# Patient Record
Sex: FEMALE | Race: BLACK OR AFRICAN AMERICAN | NOT HISPANIC OR LATINO | Employment: FULL TIME | URBAN - METROPOLITAN AREA
[De-identification: names, ages, dates, MRNs, and addresses within clinical notes are randomized per-mention and may not be internally consistent; named-entity substitution may affect disease eponyms.]

---

## 2022-03-28 ENCOUNTER — HOSPITAL ENCOUNTER (OUTPATIENT)
Dept: RADIOLOGY | Facility: HOSPITAL | Age: 47
Discharge: HOME | End: 2022-03-28
Attending: STUDENT IN AN ORGANIZED HEALTH CARE EDUCATION/TRAINING PROGRAM
Payer: OTHER GOVERNMENT

## 2022-03-28 VITALS — BODY MASS INDEX: 37.17 KG/M2 | WEIGHT: 202 LBS | HEIGHT: 62 IN

## 2022-03-28 DIAGNOSIS — Z12.31 ENCOUNTER FOR SCREENING MAMMOGRAM FOR MALIGNANT NEOPLASM OF BREAST: ICD-10-CM

## 2022-03-28 PROCEDURE — 77063 MAMMO DIGITAL SCREENING BILAT WITH TOMO: ICD-10-PCS | Mod: 26,,, | Performed by: RADIOLOGY

## 2022-03-28 PROCEDURE — 77067 MAMMO DIGITAL SCREENING BILAT WITH TOMO: ICD-10-PCS | Mod: 26,,, | Performed by: RADIOLOGY

## 2022-03-28 PROCEDURE — 77067 SCR MAMMO BI INCL CAD: CPT | Mod: 26,,, | Performed by: RADIOLOGY

## 2022-03-28 PROCEDURE — 77063 BREAST TOMOSYNTHESIS BI: CPT | Mod: TC

## 2022-03-28 PROCEDURE — 77067 SCR MAMMO BI INCL CAD: CPT | Mod: TC

## 2022-03-28 PROCEDURE — 77063 BREAST TOMOSYNTHESIS BI: CPT | Mod: 26,,, | Performed by: RADIOLOGY

## 2022-03-29 ENCOUNTER — TELEPHONE (OUTPATIENT)
Dept: SURGERY | Facility: CLINIC | Age: 47
End: 2022-03-29
Payer: OTHER GOVERNMENT

## 2022-05-03 NOTE — PROGRESS NOTES
Reason For Consultation:   Increased lifetime risk of breast cancer    Referring Provider:   Nargis Hicks MD  [address] [place] [address]    Records Obtained: Records of the patients history including those obtained from the referring provider were reviewed and summarized in detail.    HPI:   Kermit Lucas presents for consultation of increased risk of breast cancer. She is premenopausal. She presented for screening mammogram on 3/28/22 which was benign but revealed a Tyrer-Cuzick score of 28.84%.       Today, Feels good and no complaints.   No breast concerns.    High Risk Breast cancer specific history:  - Age: 46 y.o.   - Height:  1.612 meters  - Weight: 94.2 kg  - Breast density per BI-RADS: Heterogeneously Dense    - Age at menarche:  12  - Number of pregnancies: ; age of first live birth: 32  - History of breast feeding: No.   - Age at menopause, if applicable:  N/A.  -Uterus and ovaries intact: Yes  - HRT: No; OCPS x 4 years       - Genetic testing: No  - Personal history of cancer: No  - Previous chest radiation exposure between ages 10-30 years old: No  - Personal history of breast biopsy: No  - Ashkenazi Yarsanism Inheritance: No  - Family history of cancer:     Mother Breast Cancer diagnosed 61;  from BC 69    Maternal Aunt Breast Cancer in her 50's;  about 20 years now     Social History:  Tobacco use:  None  Alcohol use:  Occasionally   Exercise regimen: Tries to   Employment:      SEE CALCULATED RISK BELOW.          Family History  Family History   Problem Relation Age of Onset    Breast cancer Mother 61        Reaccurrance @ 69    Breast cancer Maternal Aunt      Medications  No current outpatient medications on file.  Allergies  Review of patient's allergies indicates:  Not on File    Review of Systems       See above   Review of Systems  Review of Systems  All other systems reviewed and are negative.    Objective:      Vitals:   Vitals:    22 1457  "  BP: (!) 146/94   Pulse: 68   Resp: 18   Temp: 98.1 °F (36.7 °C)   TempSrc: Oral   SpO2: 99%   Weight: 94.2 kg (207 lb 10.8 oz)   Height: 5' 3.47" (1.612 m)     BMI: Body mass index is 36.25 kg/m².   Body surface area is 2.05 meters squared.    Physical Exam      Laboratory Data: reviewed most recent   Imaging: reviewed most recent        Assessment:     1. At high risk for breast cancer    2. Family history of breast cancer in mother        1. Increased risk of breast cancer   * Tyrer-Cuzick (TC) lifetime risk of 30.9%. We discussed that TC score will categorize your lifetime risk of being diagnosed with breast cancer. Categories are as such: Average risk <15%, Intermediate risk 15-19%, and High risk > or = to 20%.    * The Nat Model for Breast Cancer risk: She has a 1.5% 5-year breast cancer risk (Compared with 1% for the average 46 y.o. woman) and 14.2% Lifetime breast cancer risk (Compared with 9.4% for the average 46 y.o. woman). A woman's risk is considered low if her five-year risk of developing breast cancer is less than 1.6%; it is considered high if she scores above 1.66%. (All women who are over 60 have a score of at least 1.66 and are considered high risk, based on the Nat Model.)          Risk factors are categorized into 2 groups: Modifiable and Non-modifiable. Modifiable risk factors include use of hormones, alcohol, smoking, diet and exercise. Non-modifiable risk factors include breast density, genetics, chest radiation, previous pregnancies, age of first period, and age of menopause.    * For women at high risk for breast cancer, endocrine therapy can reduce the risk of invasive and/or in situ breast cancers. (tamoxifen for premenopausal or postmenopausal women and raloxifene or exemestane for postmenopausal women).   * Discussed that Tamoxifen 20 mg daily for 5 years has shown to reduce risk of breast cancer by 49% and women with ADH/ALH or LCIS have an even more significant reduction of risk of " 86%. Aromatase inhibitors for 5 years have also shown risk reduction in terms of 50-60%. At current, there is not adequate data to recommend longer courses of therapy more than 5 years for risk reduction.    * Tamoxifen has limited data in BRCA 1/2 mutation carriers but limited retrospective data is suggestive of benefit. There is retrospective data that aromatase inhibitors can reduce the risk of contralateral ER positive breast cancers in BRCA 1/2 patients who were taking AIs as adjuvant therapy. There is no data for raloxifen in the population.    * Reviewed risks of Tamoxifen side effects include hot flashes, invasive endometrial cancer in women > 49 years of age (2.3/1000 compared to 0.9/1000), cataracts, increased risk of pulmonary embolism among others. A handout was given to her.   * Reviewed Lifestyle modifications which have shown benefit:  · Limit alcohol consumption to less than 1 drink per day (1 ounce liquor, 6 oz wine, 8 oz beer)  · Avoid smoking.  · Exercise at least 150 minutes per week of moderate intensity aerobic activity or at least 75 minutes of vigorous activity. Exercise can lower the relative risk of breast cancer by ~18-20%.  · Maintain healthy weight and avoid post-menopausal weight gain. Avoid processed foods and eat more lean proteins, fruits and vegetables.                * Discussed available resources including genetic counseling, nutrition, weight management.    * Reviewed future screening:   · Semiannual (every 6 months) CBE (clinical breast exam).   · Annual Breast MRI alternating with an annual MMG. if TC 20% or greater.                   Discussed with patient that there are several models available for stratifying breast cancer risk, and Desmond-Sonnyck is presently the model utilized by [facility] and is a model recommended per current NCCN guidelines.    The Nat Model for Breast Cancer risk estimates the absolute 5 year risk and lifetime risk of developing breast  cancer. Family history includes only first degree relatives with breast cancer, which is not enough information to estimate the risk of a patient having BRCA mutation. It also underestimates the cancer risk for patients with extensive family history. The Nat Model is a good predictor of risk for populations but not for individuals. It adjusts risk for race/ethnicity. It may underestimate breast cancer risk in patients with atypical hyperplasia and strong family history. The Nat Model was NOT designed to estimate risk for: Women with a prior diagnosis of breast cancer, lobular carcinoma in situ (LCIS), or ductal carcinoma in situ (DCIS);  Women who have received previous radiation therapy to the chest for treatment of Hodgkin lymphoma;  Women with gene mutations in BRCA1 or BRCA2, or those who are known to have certain genetic syndromes that increase risk for breast cancer; Women of age <35 or >85.    We discussed that there are limitations to every model for risk assessment, particularly that TC can overestimate risk in women with atypical hyperplasia and dense breasts and that Nat underestimates risk for those with a strong family history of breast or ovarian cancers as well as non-white women with atypical hyperplasia which can make them appear to not be candidates for risk reducing therapies.     Plan:     1. Patient has opted out chemoprevention but will call in the future if she wishes to pursue.   2. Patient elects to proceed with alternating annual mammogram and annual breast MRI along with semiannual CBEs.  3. Patient will follow up with PCP or GYN for one semiannual CBE along with annual mammogram in March 2023 and will RTC here for one semiannual CBE along with annual breast MRI in September 2022.  4. Lifestyle modifications as detailed above.   5.   Encouraged breast awareness, including monthly breast self-exams.       RTC in 1 year to see me either at [facility] or on 3rd floor.     Questions were  encouraged and answered to patient's satisfaction, and patient verbalized understanding of information and agreement with the plan. Advised patient to RTC with any interval changes or concerns.        Patient is in agreement with the proposed treatment plan. All questions were answered to the patient's satisfaction. Patient knows to call clinic for any new or worsening symptoms and if anything is needed before the next clinic visit.          Mel Mcfarland, LLOYDP-C  Hematology & Medical Oncology   [address] [place] [address]  ph. [phone]  Fax. [phone]    Collaborating physician, Dr. Naranjo.    Total time spent with the patient: 30 minutes, 20 minutes of face to face consultation and 10 minutes of chart review and coordination of care, on the day of the visit. This includes face to face time and non-face to face time preparing to see the patient (eg, review of tests), obtaining and/or reviewing separately obtained history, documenting clinical information in the electronic or other health record, independently interpreting resultsand communicating results to the patient/family/caregiver, or care coordination.    Route Chart for Scheduling    Med Onc Chart Routing      Follow up with physician    Follow up with [facility] 1 year.   Labs    Imaging Mammogram and MRI   MRI in September, Mammo in June    Pharmacy appointment    Other referrals

## 2022-05-17 ENCOUNTER — OFFICE VISIT (OUTPATIENT)
Dept: HEMATOLOGY/ONCOLOGY | Facility: CLINIC | Age: 47
End: 2022-05-17
Payer: OTHER GOVERNMENT

## 2022-05-17 VITALS
HEART RATE: 68 BPM | OXYGEN SATURATION: 99 % | WEIGHT: 207.69 LBS | TEMPERATURE: 98 F | RESPIRATION RATE: 18 BRPM | HEIGHT: 63 IN | BODY MASS INDEX: 36.8 KG/M2 | DIASTOLIC BLOOD PRESSURE: 94 MMHG | SYSTOLIC BLOOD PRESSURE: 146 MMHG

## 2022-05-17 DIAGNOSIS — Z80.3 FAMILY HISTORY OF BREAST CANCER IN MOTHER: ICD-10-CM

## 2022-05-17 DIAGNOSIS — Z91.89 AT HIGH RISK FOR BREAST CANCER: Primary | ICD-10-CM

## 2022-05-17 PROCEDURE — 99204 PR OFFICE/OUTPT VISIT, NEW, LEVL IV, 45-59 MIN: ICD-10-PCS | Mod: S$PBB,,, | Performed by: NURSE PRACTITIONER

## 2022-05-17 PROCEDURE — 99999 PR PBB SHADOW E&M-EST. PATIENT-LVL III: ICD-10-PCS | Mod: PBBFAC,,, | Performed by: NURSE PRACTITIONER

## 2022-05-17 PROCEDURE — 99204 OFFICE O/P NEW MOD 45 MIN: CPT | Mod: S$PBB,,, | Performed by: NURSE PRACTITIONER

## 2022-05-17 PROCEDURE — 99999 PR PBB SHADOW E&M-EST. PATIENT-LVL III: CPT | Mod: PBBFAC,,, | Performed by: NURSE PRACTITIONER

## 2022-05-17 PROCEDURE — 99213 OFFICE O/P EST LOW 20 MIN: CPT | Mod: PBBFAC | Performed by: NURSE PRACTITIONER

## 2022-06-30 ENCOUNTER — HOSPITAL ENCOUNTER (OUTPATIENT)
Dept: RADIOLOGY | Facility: HOSPITAL | Age: 47
Discharge: HOME | End: 2022-06-30
Attending: NURSE PRACTITIONER
Payer: OTHER GOVERNMENT

## 2022-06-30 DIAGNOSIS — Z91.89 AT HIGH RISK FOR BREAST CANCER: ICD-10-CM

## 2022-06-30 DIAGNOSIS — Z80.3 FAMILY HISTORY OF BREAST CANCER IN MOTHER: ICD-10-CM

## 2022-08-23 PROBLEM — Z91.89 AT HIGH RISK FOR BREAST CANCER: Status: ACTIVE | Noted: 2022-08-23

## 2022-08-23 NOTE — PROGRESS NOTES
"  Reason For Consultation:   Increased lifetime risk of breast cancer    Referring Provider:   Aaarefnickal Self  No address on file    Records Obtained: Records of the patients history including those obtained from the referring provider were reviewed and summarized in detail.    HPI:   Kermit Lucas presents for consultation of increased risk of breast cancer. She is {Menopause:24754}. She presented for screening mammogram on *** which was benign but revealed a Tyrer-Cuzick score of ***%.       Today, Feels good and no complaints.   No breast concerns.    High Risk Breast cancer specific history:  - Age: 46 y.o.   - Height:  ***  - Weight: ***  - Breast density per BI-RADS:    - Age at menarche:  ***  - Number of pregnancies: G***P***; age of first live birth: ***   ***G:  (number of pregnancies) · P: para (number of completed pregnancies beyond 20 weeks gestation (whether viable or nonviable) · A or Ab: abortus (abortions) · nulligravida  0: no pregnancies.***  - History of breast feeding: {YES /NO:34957}.   - Age at menopause, if applicable:  ***. ***Reports regular/irregular*** menstrual cycle and reports/denies*** menopausal symptoms (hot flashes, sleep disturbances, depression, vaginal dryness).  -Uterus and ovaries intact: {yes no:439301::"Yes"}  - HRT: {YES /NO:40493}      - Genetic testing: {YES /NO:04348}  - Personal history of cancer: {YES /NO:40458}  - Previous chest radiation exposure between ages 10-30 years old: {YES /NO:71483}  - Personal history of breast biopsy: {YES /NO:95314}  - Ashkenazi Spiritism Inheritance: {YES /NO:52317}  - Family history of cancer:  ***    Social History:  Tobacco use:  ***  Alcohol use:  ***  Exercise regimen: ***  Employment: ***    SEE CALCULATED RISK BELOW.     Past Medical   No past medical history on file.  Patient Active Problem List   Diagnosis    At high risk for breast cancer     Social History      Family History  Family History   Problem Relation " Age of Onset    Breast cancer Mother 61        Reaccurrance @ 69    Breast cancer Maternal Aunt      Medications  No current outpatient medications on file.  Allergies  Review of patient's allergies indicates:  No Known Allergies    Review of Systems       See above   Review of Systems  Review of Systems  All other systems reviewed and are negative.    Objective:      Vitals: There were no vitals filed for this visit.  BMI: There is no height or weight on file to calculate BMI.   There is no height or weight on file to calculate BSA.    Physical Exam      Laboratory Data: reviewed most recent   Imaging: reviewed most recent        Assessment:     1. At high risk for breast cancer        1. Increased risk of breast cancer   * Bria (TC) lifetime risk of ***%. We discussed that TC score will categorize your lifetime risk of being diagnosed with breast cancer. Categories are as such: Average risk <15%, Intermediate risk 15-19%, and High risk > or = to 20%.    *The Nat Model for Breast Cancer risk: She has a ***% 5-year breast cancer risk (Compared with ***% for the average 46 y.o. woman) and ***% Lifetime breast cancer risk (Compared with ***% for the average 46 y.o. woman). A woman's risk is considered low if her five-year risk of developing breast cancer is less than 1.6%; it is considered high if she scores above 1.66%. (All women who are over 60 have a score of at least 1.66 and are considered high risk, based on the Nat Model.)     Recommendation for women with elevated TC score:      Recommendation for women with elevated Nat Model.     ***TC to determine MRI and Nat to determine chemoprevention***    Risk factors are categorized into 2 groups: Modifiable and Non-modifiable. Modifiable risk factors include use of hormones, alcohol, smoking, diet and exercise. Non-modifiable risk factors include breast density, genetics, chest radiation, previous pregnancies, age of first period, and age of  menopause.    * For women at high risk for breast cancer, endocrine therapy can reduce the risk of invasive and/or in situ breast cancers. (tamoxifen for premenopausal or postmenopausal women and raloxifene or exemestane for postmenopausal women).   * Discussed that Tamoxifen 20 mg daily for 5 years has shown to reduce risk of breast cancer by 49% and women with ADH/ALH or LCIS have an even more significant reduction of risk of 86%. Aromatase inhibitors for 5 years have also shown risk reduction in terms of 50-60%. At current, there is not adequate data to recommend longer courses of therapy more than 5 years for risk reduction.    * Tamoxifen has limited data in BRCA 1/2 mutation carriers but limited retrospective data is suggestive of benefit. There is retrospective data that aromatase inhibitors can reduce the risk of contralateral ER positive breast cancers in BRCA 1/2 patients who were taking AIs as adjuvant therapy. There is no data for raloxifen in the population.    * Reviewed risks of Tamoxifen side effects include hot flashes, invasive endometrial cancer in women > 49 years of age (2.3/1000 compared to 0.9/1000), cataracts, increased risk of pulmonary embolism among others. A handout was given to her.   * Reviewed Lifestyle modifications which have shown benefit:  · Limit alcohol consumption to less than 1 drink per day (1 ounce liquor, 6 oz wine, 8 oz beer)  · Avoid smoking.  · Exercise at least 150 minutes per week of moderate intensity aerobic activity or at least 75 minutes of vigorous activity. Exercise can lower the relative risk of breast cancer by ~18-20%.  · Maintain healthy weight and avoid post-menopausal weight gain. Avoid processed foods and eat more lean proteins, fruits and vegetables.                * Discussed available resources including genetic counseling, nutrition, weight management.    * Reviewed future screening:   · Semiannual (every 6 months) CBE (clinical breast exam).   · Annual  Breast MRI alternating with an annual MMG. if TC 20% or greater.                   Discussed with patient that there are several models available for stratifying breast cancer risk, and Desmond-Sonnyck is presently the model utilized by [facility] and is a model recommended per current NCCN guidelines.    The Nat Model for Breast Cancer risk estimates the absolute 5 year risk and lifetime risk of developing breast cancer. Family history includes only first degree relatives with breast cancer, which is not enough information to estimate the risk of a patient having BRCA mutation. It also underestimates the cancer risk for patients with extensive family history. The Nat Model is a good predictor of risk for populations but not for individuals. It adjusts risk for race/ethnicity. It may underestimate breast cancer risk in patients with atypical hyperplasia and strong family history. The Nat Model was NOT designed to estimate risk for: Women with a prior diagnosis of breast cancer, lobular carcinoma in situ (LCIS), or ductal carcinoma in situ (DCIS);  Women who have received previous radiation therapy to the chest for treatment of Hodgkin lymphoma;  Women with gene mutations in BRCA1 or BRCA2, or those who are known to have certain genetic syndromes that increase risk for breast cancer; Women of age <35 or >85.    We discussed that there are limitations to every model for risk assessment, particularly that TC can overestimate risk in women with atypical hyperplasia and dense breasts and that Nat underestimates risk for those with a strong family history of breast or ovarian cancers as well as non-white women with atypical hyperplasia which can make them appear to not be candidates for risk reducing therapies.             Plan:     1. Patient has opted *** chemoprevention but will call in the future if she wishes to pursue. ***if starting tamoxifen, needs to change OCP to possibly IUD. Instructed to start  while on cycle.***  2. Patient elects to proceed with {options for increased risk of breast cancer:82251}.  3. Patient will follow up with PCP or GYN for one semiannual CBE along with annual mammogram in *** and will RTC here for one semiannual CBE along with annual breast MRI in ***.  4. Lifestyle modifications as detailed above.   5.   Encouraged breast awareness, including monthly breast self-exams.   6.   ***Refer to Filipe Park NP  for Genetic counseling.   7.   ***Refer to nutritionist or bariatrician.   8.   ***Refer to Dr. Nathan for a well woman to discuss hormonal **** or contraceptive therapy****    RTC in *** to see me either at [facility] or on 3rd floor..     Questions were encouraged and answered to patient's satisfaction, and patient verbalized understanding of information and agreement with the plan. Advised patient to RTC with any interval changes or concerns.        ***Patient declines {options for increased risk of breast cancer:82615} at this time and was advised to contact clinic with any changes in her decision.       *** OTHER THOUGHTS***  Risk reduction therapy in those < 35 years of age is unknown.     Can consider AI if Tamoxifen contraindicated.     Raloxifene can be given to post-menopausal women > 36 yo for risk reduction.  Unlikely as effective as Tamoxifen, but good option if uterus still in place or osteoporosis. Raloxifene has been shown to have a reduction in invasive breast cancer, a reduction in incidence of vertebral fractures, no increased incidence of endometrial cancer.     Should try to avoid tamoxifen in PTEN patients due to PTEN carrying endometrial cancer risk.       Patient is in agreement with the proposed treatment plan. All questions were answered to the patient's satisfaction. Patient knows to call clinic for any new or worsening symptoms and if anything is needed before the next clinic visit.          Mel Mcfarland, FNP-C  Hematology & Medical Oncology   [address]  [place] [address]  ph. [phone]  Fax. [phone]    Patient discussed with collaborating physician,  ***.    Total time spent with the patient: *** minutes, *** minutes of face to face consultation and *** minutes of chart review and coordination of care, on the day of the visit. This includes face to face time and non-face to face time preparing to see the patient (eg, review of tests), obtaining and/or reviewing separately obtained history, documenting clinical information in the electronic or other health record, independently interpreting resultsand communicating results to the patient/family/caregiver, or care coordination.

## 2022-09-06 ENCOUNTER — OFFICE VISIT (OUTPATIENT)
Dept: HEMATOLOGY/ONCOLOGY | Facility: CLINIC | Age: 47
End: 2022-09-06
Payer: OTHER GOVERNMENT

## 2022-09-06 VITALS
TEMPERATURE: 93 F | BODY MASS INDEX: 36.45 KG/M2 | SYSTOLIC BLOOD PRESSURE: 138 MMHG | OXYGEN SATURATION: 96 % | RESPIRATION RATE: 18 BRPM | HEART RATE: 73 BPM | DIASTOLIC BLOOD PRESSURE: 69 MMHG | WEIGHT: 205.69 LBS | HEIGHT: 63 IN

## 2022-09-06 DIAGNOSIS — Z91.89 AT HIGH RISK FOR BREAST CANCER: ICD-10-CM

## 2022-09-06 PROCEDURE — 99999 PR PBB SHADOW E&M-EST. PATIENT-LVL III: ICD-10-PCS | Mod: PBBFAC,,, | Performed by: NURSE PRACTITIONER

## 2022-09-06 PROCEDURE — 99214 PR OFFICE/OUTPT VISIT, EST, LEVL IV, 30-39 MIN: ICD-10-PCS | Mod: S$PBB,,, | Performed by: NURSE PRACTITIONER

## 2022-09-06 PROCEDURE — 99213 OFFICE O/P EST LOW 20 MIN: CPT | Mod: PBBFAC | Performed by: NURSE PRACTITIONER

## 2022-09-06 PROCEDURE — 99214 OFFICE O/P EST MOD 30 MIN: CPT | Mod: S$PBB,,, | Performed by: NURSE PRACTITIONER

## 2022-09-06 PROCEDURE — 99999 PR PBB SHADOW E&M-EST. PATIENT-LVL III: CPT | Mod: PBBFAC,,, | Performed by: NURSE PRACTITIONER

## 2022-09-06 RX ORDER — DICLOFENAC SODIUM 10 MG/G
GEL TOPICAL
COMMUNITY
Start: 2022-07-05

## 2022-09-06 RX ORDER — AMLODIPINE BESYLATE 10 MG/1
1 TABLET ORAL DAILY
COMMUNITY
Start: 2021-09-07 | End: 2022-09-09

## 2022-09-06 RX ORDER — TRAZODONE HYDROCHLORIDE 100 MG/1
1 TABLET ORAL NIGHTLY PRN
COMMUNITY
Start: 2022-03-04

## 2022-09-06 RX ORDER — LEVONORGESTREL AND ETHINYL ESTRADIOL 0.15-0.03
1 KIT ORAL DAILY
COMMUNITY
Start: 2022-02-16

## 2022-09-06 NOTE — PROGRESS NOTES
Reason For Consultation:   Increased lifetime risk of breast cancer    Records Obtained: Records of the patients history including those obtained from the referring provider were reviewed and summarized in detail.    HPI:   Kermit Lucas presents for consultation of increased risk of breast cancer. She is premenopausal. She presented for screening mammogram on 3/28/22 which was benign but revealed a Tyrer-Cuzick score of 28.84%.       Today, Feels good and no complaints.   No breast concerns. Denies nipple discharge. Denies skin changes to the breasts.   Denies smoking, occasional alcohol. Minimal exercise.     High Risk Breast cancer specific history:  - Age: 47 y.o.   - Height:  1.612 meters  - Weight: 94.2 kg  - Breast density per BI-RADS: Heterogeneously Dense    - Age at menarche:  12  - Number of pregnancies: ; age of first live birth: 32  - History of breast feeding: No.   - Age at menopause, if applicable:  N/A.  -Uterus and ovaries intact: Yes  - HRT: No; OCPS x 4 years       - Genetic testing: No  - Personal history of cancer: No  - Previous chest radiation exposure between ages 10-30 years old: No  - Personal history of breast biopsy: No  - Ashkenazi Yarsanism Inheritance: No  - Family history of cancer:     Mother Breast Cancer diagnosed 61;  from BC 69    Maternal Aunt Breast Cancer in her 50's;  about 20 years now     Social History:  Tobacco use:  None  Alcohol use:  Occasionally   Exercise regimen: Tries to   Employment:      SEE CALCULATED RISK BELOW.          Family History  Family History   Problem Relation Age of Onset    Breast cancer Mother 61        Reaccurrance @ 69    Breast cancer Maternal Aunt      Medications    Current Outpatient Medications:     amLODIPine (NORVASC) 10 MG tablet, Take 1 tablet by mouth once daily., Disp: , Rfl:     diclofenac sodium (VOLTAREN) 1 % Gel, Apply topically., Disp: , Rfl:     levonorgestrel-ethinyl estradiol (SEASONALE) 0.15  "mg-30 mcg (91) per tablet, Take 1 tablet by mouth once daily., Disp: , Rfl:     traZODone (DESYREL) 100 MG tablet, Take 1 tablet by mouth nightly as needed., Disp: , Rfl:   Allergies  Review of patient's allergies indicates:  No Known Allergies    Review of Systems       See above   Review of Systems  Review of Systems  All other systems reviewed and are negative.    Objective:      Vitals:   Vitals:    09/06/22 1456   BP: 138/69   Pulse: 73   Resp: 18   Temp: (!) 93.3 °F (34.1 °C)   TempSrc: Oral   SpO2: 96%   Weight: 93.3 kg (205 lb 11 oz)   Height: 5' 3.47" (1.612 m)     BMI: Body mass index is 35.9 kg/m².   Body surface area is 2.04 meters squared.    Physical Exam  Vitals and nursing note reviewed.   Constitutional:       General: She is not in acute distress.     Appearance: Normal appearance. She is well-developed.   HENT:      Head: Normocephalic.   Cardiovascular:      Rate and Rhythm: Normal rate and regular rhythm.      Heart sounds: Normal heart sounds.   Pulmonary:      Effort: Pulmonary effort is normal.      Breath sounds: Normal breath sounds.   Chest:   Breasts:     Right: Normal.      Left: Normal.   Abdominal:      General: Bowel sounds are normal. There is no distension.      Palpations: Abdomen is soft.      Tenderness: There is no abdominal tenderness.   Musculoskeletal:      Cervical back: Normal range of motion.   Lymphadenopathy:      Cervical: No cervical adenopathy.      Upper Body:      Right upper body: No supraclavicular or axillary adenopathy.      Left upper body: No supraclavicular or axillary adenopathy.   Skin:     General: Skin is warm and dry.      Findings: No rash.   Neurological:      Mental Status: She is alert and oriented to person, place, and time.   Psychiatric:         Behavior: Behavior normal.         Laboratory Data: reviewed most recent   Imaging: reviewed most recent        Assessment:     1. At high risk for breast cancer        1. Increased risk of breast " cancer   * Tyrer-Cuzick (TC) lifetime risk of 32.1% recalculated in clinic today. We discussed that TC score will categorize your lifetime risk of being diagnosed with breast cancer. Categories are as such: Average risk <15%, Intermediate risk 15-19%, and High risk > or = to 20%.    * The Nat Model for Breast Cancer risk: She has a 1.5% 5-year breast cancer risk (Compared with 1% for the average 47 y.o. woman) and 14.2% Lifetime breast cancer risk (Compared with 9.4% for the average 47 y.o. woman). A woman's risk is considered low if her five-year risk of developing breast cancer is less than 1.6%; it is considered high if she scores above 1.66%. (All women who are over 60 have a score of at least 1.66 and are considered high risk, based on the Nat Model.)      Risk factors are categorized into 2 groups: Modifiable and Non-modifiable. Modifiable risk factors include use of hormones, alcohol, smoking, diet and exercise. Non-modifiable risk factors include breast density, genetics, chest radiation, previous pregnancies, age of first period, and age of menopause.    * For women at high risk for breast cancer, endocrine therapy can reduce the risk of invasive and/or in situ breast cancers. (tamoxifen for premenopausal or postmenopausal women and raloxifene or exemestane for postmenopausal women).   * Discussed that Tamoxifen 20 mg daily for 5 years has shown to reduce risk of breast cancer by 49% and women with ADH/ALH or LCIS have an even more significant reduction of risk of 86%. Aromatase inhibitors for 5 years have also shown risk reduction in terms of 50-60%. At current, there is not adequate data to recommend longer courses of therapy more than 5 years for risk reduction.       * Reviewed risks of Tamoxifen side effects include hot flashes, invasive endometrial cancer in women > 49 years of age (2.3/1000 compared to 0.9/1000), cataracts, increased risk of pulmonary embolism among others. A handout was given to  her.   * Reviewed Lifestyle modifications which have shown benefit:  Limit alcohol consumption to less than 1 drink per day (1 ounce liquor, 6 oz wine, 8 oz beer)  Avoid smoking.  Exercise at least 150 minutes per week of moderate intensity aerobic activity or at least 75 minutes of vigorous activity. Exercise can lower the relative risk of breast cancer by ~18-20%.  Maintain healthy weight and avoid post-menopausal weight gain. Avoid processed foods and eat more lean proteins, fruits and vegetables.                * Discussed available resources including genetic counseling, nutrition, weight management.    * Reviewed future screening:   Semiannual (every 6 months) CBE (clinical breast exam).   Annual Breast MRI alternating with an annual MMG. if TC 20% or greater.                   Discussed with patient that there are several models available for stratifying breast cancer risk, and Desmond-Harmeet is presently the model utilized by [facility] and is a model recommended per current NCCN guidelines.    The Nat Model for Breast Cancer risk estimates the absolute 5 year risk and lifetime risk of developing breast cancer. Family history includes only first degree relatives with breast cancer, which is not enough information to estimate the risk of a patient having BRCA mutation. It also underestimates the cancer risk for patients with extensive family history. The Nat Model is a good predictor of risk for populations but not for individuals. It adjusts risk for race/ethnicity. It may underestimate breast cancer risk in patients with atypical hyperplasia and strong family history. The Nat Model was NOT designed to estimate risk for: Women with a prior diagnosis of breast cancer, lobular carcinoma in situ (LCIS), or ductal carcinoma in situ (DCIS);  Women who have received previous radiation therapy to the chest for treatment of Hodgkin lymphoma;  Women with gene mutations in BRCA1 or BRCA2, or those who are  known to have certain genetic syndromes that increase risk for breast cancer; Women of age <35 or >85.    We discussed that there are limitations to every model for risk assessment, particularly that TC can overestimate risk in women with atypical hyperplasia and dense breasts and that Nat underestimates risk for those with a strong family history of breast or ovarian cancers as well as non-white women with atypical hyperplasia which can make them appear to not be candidates for risk reducing therapies.     Plan:     Patient has opted out chemoprevention but will call in the future if she wishes to pursue.   Patient elects to proceed with alternating annual mammogram and annual breast MRI along with semiannual CBEs.  Patient will follow up with PCP or GYN for one semiannual CBE along with annual mammogram in March 2023 and will RTC here for one semiannual CBE along with annual breast MRI in September 2022.  Lifestyle modifications as detailed above.   5.   Encouraged breast awareness, including monthly breast self-exams.       RTC in 1 year to see me either at [facility] or on 3rd floor.     Questions were encouraged and answered to patient's satisfaction, and patient verbalized understanding of information and agreement with the plan. Advised patient to RTC with any interval changes or concerns.        Patient is in agreement with the proposed treatment plan. All questions were answered to the patient's satisfaction. Patient knows to call clinic for any new or worsening symptoms and if anything is needed before the next clinic visit.          Mel Mcfarland, LLOYDP-C  Hematology & Medical Oncology   [facility] [place] [address]  ph. [phone]  Fax. [phone]    Collaborating physician, Dr. Naranjo.    Total time spent with the patient: 30 minutes, 20 minutes of face to face consultation and 10 minutes of chart review and coordination of care, on the day of the visit. This includes face to face time  and non-face to face time preparing to see the patient (eg, review of tests), obtaining and/or reviewing separately obtained history, documenting clinical information in the electronic or other health record, independently interpreting resultsand communicating results to the patient/family/caregiver, or care coordination.    Route Chart for Scheduling    Med Onc Chart Routing      Follow up with physician    Follow up with [facility] 6 months.   Infusion scheduling note    Injection scheduling note    Labs    Imaging Mammogram and MRI   Mammo in June   Pharmacy appointment    Other referrals

## 2022-09-20 ENCOUNTER — HOSPITAL ENCOUNTER (OUTPATIENT)
Dept: RADIOLOGY | Facility: HOSPITAL | Age: 47
Discharge: HOME | End: 2022-09-20
Attending: NURSE PRACTITIONER
Payer: OTHER GOVERNMENT

## 2022-09-20 DIAGNOSIS — Z91.89 AT HIGH RISK FOR BREAST CANCER: ICD-10-CM

## 2022-09-20 DIAGNOSIS — Z80.3 FAMILY HISTORY OF BREAST CANCER IN MOTHER: ICD-10-CM

## 2022-09-20 PROCEDURE — 77049 MRI BREAST W/WO CONTRAST, W/CAD, BILATERAL: ICD-10-PCS | Mod: 26,,, | Performed by: RADIOLOGY

## 2022-09-20 PROCEDURE — A9577 INJ MULTIHANCE: HCPCS | Performed by: NURSE PRACTITIONER

## 2022-09-20 PROCEDURE — 25500020 PHARM REV CODE 255: Performed by: NURSE PRACTITIONER

## 2022-09-20 PROCEDURE — 77049 MRI BREAST C-+ W/CAD BI: CPT | Mod: TC

## 2022-09-20 PROCEDURE — 77049 MRI BREAST C-+ W/CAD BI: CPT | Mod: 26,,, | Performed by: RADIOLOGY

## 2022-09-20 RX ADMIN — GADOBENATE DIMEGLUMINE 20 ML: 529 INJECTION, SOLUTION INTRAVENOUS at 03:09

## 2022-09-26 ENCOUNTER — TELEPHONE (OUTPATIENT)
Dept: RADIOLOGY | Facility: HOSPITAL | Age: 47
End: 2022-09-26
Payer: OTHER GOVERNMENT

## 2022-09-26 NOTE — TELEPHONE ENCOUNTER
Spoke with patient. Reviewed breast biopsy procedure and reviewed instructions for breast biopsy. Patient expressed understanding and all questions were answered. Provided patient with my phone number to call for any further concerns or questions.   Patient scheduled breast biopsy at the [facility] on 9/30/2022.

## 2022-09-28 ENCOUNTER — TELEPHONE (OUTPATIENT)
Dept: HEMATOLOGY/ONCOLOGY | Facility: CLINIC | Age: 47
End: 2022-09-28
Payer: OTHER GOVERNMENT

## 2022-09-28 NOTE — TELEPHONE ENCOUNTER
"Returned call, spoke to pt.   Questions answered.         ----- Message from Isadora Us sent at 9/28/2022  4:09 PM CDT -----  Regarding: speak with office  Contact: antony  Consult/Advisory:           Name Of Caller: antony    Contact Preference?:[phone] (home)          Does patient feel the need to be seen today? no           What is the nature of the call?:calling to speak with kanu returning call           Additional Notes:  "Thank you for all that you do for our patients'"     "

## 2022-09-28 NOTE — TELEPHONE ENCOUNTER
Returned call. No answer. Left VM        ----- Message from Modesta Bolaños sent at 9/28/2022  1:18 PM CDT -----  Contact: pt  Pt requesting call back re: pt states [facility] of the findings on her [facility] so that they can cover her appt on 9-30. Pt states she is unsure exactly what they need.     Confirmed contact below:  Contact Name:Kermit Lucas  Phone Number: [phone]

## 2022-09-30 ENCOUNTER — HOSPITAL ENCOUNTER (OUTPATIENT)
Dept: RADIOLOGY | Facility: HOSPITAL | Age: 47
Discharge: HOME | End: 2022-09-30
Attending: NURSE PRACTITIONER
Payer: OTHER GOVERNMENT

## 2022-09-30 DIAGNOSIS — R92.8 ABNORMAL FINDING ON BREAST IMAGING: ICD-10-CM

## 2022-09-30 PROCEDURE — 77061 BREAST TOMOSYNTHESIS UNI: CPT | Mod: 26,LT,, | Performed by: RADIOLOGY

## 2022-09-30 PROCEDURE — 76642 US BREAST LEFT LIMITED: ICD-10-PCS | Mod: 26,LT,, | Performed by: RADIOLOGY

## 2022-09-30 PROCEDURE — 77065 MAMMO DIGITAL DIAGNOSTIC LEFT WITH TOMO: ICD-10-PCS | Mod: 26,LT,, | Performed by: RADIOLOGY

## 2022-09-30 PROCEDURE — 76642 ULTRASOUND BREAST LIMITED: CPT | Mod: 26,LT,, | Performed by: RADIOLOGY

## 2022-09-30 PROCEDURE — 77061 MAMMO DIGITAL DIAGNOSTIC LEFT WITH TOMO: ICD-10-PCS | Mod: 26,LT,, | Performed by: RADIOLOGY

## 2022-09-30 PROCEDURE — 77065 DX MAMMO INCL CAD UNI: CPT | Mod: TC,LT

## 2022-09-30 PROCEDURE — 76642 ULTRASOUND BREAST LIMITED: CPT | Mod: TC,LT

## 2022-09-30 PROCEDURE — 77065 DX MAMMO INCL CAD UNI: CPT | Mod: 26,LT,, | Performed by: RADIOLOGY

## 2022-10-03 DIAGNOSIS — Z91.89 AT HIGH RISK FOR BREAST CANCER: Primary | ICD-10-CM

## 2022-11-22 DIAGNOSIS — Z12.11 SPECIAL SCREENING FOR MALIGNANT NEOPLASMS, COLON: Primary | ICD-10-CM

## 2023-01-17 ENCOUNTER — CLINICAL SUPPORT (OUTPATIENT)
Dept: ENDOSCOPY | Facility: HOSPITAL | Age: 48
End: 2023-01-17
Attending: INTERNAL MEDICINE
Payer: OTHER GOVERNMENT

## 2023-01-17 DIAGNOSIS — Z12.11 SPECIAL SCREENING FOR MALIGNANT NEOPLASMS, COLON: ICD-10-CM

## 2023-01-17 NOTE — PLAN OF CARE
Patient reports recent heart palpitations and wearing a holter monitor along with a surgery in November. [facility] on [facility]17/23. New PAT appointment scheduled for pt.

## 2023-03-08 ENCOUNTER — HOSPITAL ENCOUNTER (OUTPATIENT)
Dept: RADIOLOGY | Facility: HOSPITAL | Age: 48
Discharge: HOME | End: 2023-03-08
Attending: NURSE PRACTITIONER
Payer: OTHER GOVERNMENT

## 2023-03-08 DIAGNOSIS — Z91.89 AT HIGH RISK FOR BREAST CANCER: ICD-10-CM

## 2023-03-08 PROCEDURE — 77049 MRI BREAST C-+ W/CAD BI: CPT | Mod: 26,,, | Performed by: RADIOLOGY

## 2023-03-08 PROCEDURE — 25500020 PHARM REV CODE 255: Performed by: NURSE PRACTITIONER

## 2023-03-08 PROCEDURE — 77049 MRI BREAST C-+ W/CAD BI: CPT | Mod: TC

## 2023-03-08 PROCEDURE — 77049 MRI BREAST W/WO CONTRAST, W/CAD, BILATERAL: ICD-10-PCS | Mod: 26,,, | Performed by: RADIOLOGY

## 2023-03-08 PROCEDURE — A9577 INJ MULTIHANCE: HCPCS | Performed by: NURSE PRACTITIONER

## 2023-03-08 RX ADMIN — GADOBENATE DIMEGLUMINE 19 ML: 529 INJECTION, SOLUTION INTRAVENOUS at 11:03

## 2023-03-13 DIAGNOSIS — Z91.89 AT HIGH RISK FOR BREAST CANCER: Primary | ICD-10-CM

## 2023-03-27 ENCOUNTER — CLINICAL SUPPORT (OUTPATIENT)
Dept: ENDOSCOPY | Facility: HOSPITAL | Age: 48
End: 2023-03-27
Attending: INTERNAL MEDICINE
Payer: OTHER GOVERNMENT

## 2023-03-27 DIAGNOSIS — Z12.11 SPECIAL SCREENING FOR MALIGNANT NEOPLASMS, COLON: ICD-10-CM

## 2023-03-27 NOTE — PLAN OF CARE
Called patient for PAT appointment in regards to scheduling colonoscopy. Patient stated she had cardiac test earlier this month. Colonoscopy pending results of recent test.

## 2023-03-28 ENCOUNTER — PATIENT MESSAGE (OUTPATIENT)
Dept: RESEARCH | Facility: HOSPITAL | Age: 48
End: 2023-03-28
Payer: OTHER GOVERNMENT

## 2023-04-11 NOTE — PROGRESS NOTES
Reason For Consultation:   Increased lifetime risk of breast cancer    Records Obtained: Records of the patients history including those obtained from the referring provider were reviewed and summarized in detail.    HPI:   Kermit Lucas presents for follow up for increased risk of breast cancer. She is premenopausal. She presented for screening mammogram on 3/28/22 which was benign but revealed a Tyrer-Cuzick score of 28.84%.       Today, Feels good and no complaints.   No breast concerns. Denies nipple discharge. Denies skin changes to the breasts.   Denies family history changes.   Denies smoking, occasional alcohol. She has been going to the gym 3 days a week.      High Risk Breast cancer specific history:  - Age: 47 y.o.   - Height:  1.612 meters  - Weight: 94.2 kg  - Breast density per BI-RADS: Heterogeneously Dense    - Age at menarche:  12  - Number of pregnancies: ; age of first live birth: 32  - History of breast feeding: No.   - Age at menopause, if applicable:  N/A.  -Uterus and ovaries intact: Yes  - HRT: No; OCPS x 4 years       - Genetic testing: No  - Personal history of cancer: No  - Previous chest radiation exposure between ages 10-30 years old: No  - Personal history of breast biopsy: No  - Ashkenazi Rastafarian Inheritance: No  - Family history of cancer:     Mother Breast Cancer diagnosed 61;  from BC 69    Maternal Aunt Breast Cancer in her 50's;  about 20 years now     Social History:  Tobacco use:  None  Alcohol use:  Occasionally   Exercise regimen: Tries to   Employment:      SEE CALCULATED RISK BELOW.          Family History  Family History   Problem Relation Age of Onset    Breast cancer Mother 61        Reaccurrance @ 69    Breast cancer Maternal Aunt      Medications    Current Outpatient Medications:     amLODIPine (NORVASC) 10 MG tablet, Take 1 tablet by mouth once daily., Disp: , Rfl:     amoxicillin (AMOXIL) 500 MG capsule, Take 500 mg by mouth 3  "(three) times daily., Disp: , Rfl:     diclofenac sodium (VOLTAREN) 1 % Gel, Apply topically., Disp: , Rfl:     levonorgestrel-ethinyl estradiol (SEASONALE) 0.15 mg-30 mcg (91) per tablet, Take 1 tablet by mouth once daily., Disp: , Rfl:     naproxen (NAPROSYN) 500 MG tablet, Take 1 tablet (500 mg total) by mouth 2 (two) times daily with meals., Disp: 60 tablet, Rfl: 0    ondansetron (ZOFRAN-ODT) 4 MG TbDL, Take 1 tablet (4 mg total) by mouth every 6 (six) hours as needed., Disp: 15 tablet, Rfl: 0    traZODone (DESYREL) 100 MG tablet, Take 1 tablet by mouth nightly as needed., Disp: , Rfl:   Allergies  Review of patient's allergies indicates:  No Known Allergies    Review of Systems       See above   Review of Systems  Review of Systems  All other systems reviewed and are negative.    Objective:      Vitals:   Vitals:    04/25/23 0832   BP: (!) 147/69   Pulse: 67   Resp: 18   Temp: 98.1 °F (36.7 °C)   TempSrc: Oral   SpO2: 98%   Weight: 94.6 kg (208 lb 8.9 oz)   Height: 5' 2" (1.575 m)       BMI: Body mass index is 38.15 kg/m².   Body surface area is 2.03 meters squared.    Physical Exam  Vitals and nursing note reviewed.   Constitutional:       General: She is not in acute distress.     Appearance: Normal appearance. She is well-developed.   HENT:      Head: Normocephalic.   Cardiovascular:      Rate and Rhythm: Normal rate and regular rhythm.      Heart sounds: Normal heart sounds.   Pulmonary:      Effort: Pulmonary effort is normal.      Breath sounds: Normal breath sounds.   Chest:   Breasts:     Right: Normal.      Left: Normal.   Abdominal:      General: Bowel sounds are normal. There is no distension.      Palpations: Abdomen is soft.      Tenderness: There is no abdominal tenderness.   Musculoskeletal:      Cervical back: Normal range of motion.   Lymphadenopathy:      Cervical: No cervical adenopathy.      Upper Body:      Right upper body: No supraclavicular or axillary adenopathy.      Left upper body: " No supraclavicular or axillary adenopathy.   Skin:     General: Skin is warm and dry.      Findings: No rash.   Neurological:      Mental Status: She is alert and oriented to person, place, and time.   Psychiatric:         Behavior: Behavior normal.         Laboratory Data: reviewed most recent   Imaging: reviewed most recent        Assessment:     1. At high risk for breast cancer        1. Increased risk of breast cancer   * Tyrer-Cuzick (TC) lifetime risk of 32.1% recalculated in clinic today. We discussed that TC score will categorize your lifetime risk of being diagnosed with breast cancer. Categories are as such: Average risk <15%, Intermediate risk 15-19%, and High risk > or = to 20%.    * The Nat Model for Breast Cancer risk: She has a 1.5% 5-year breast cancer risk (Compared with 1% for the average 47 y.o. woman) and 14.2% Lifetime breast cancer risk (Compared with 9.4% for the average 47 y.o. woman). A woman's risk is considered low if her five-year risk of developing breast cancer is less than 1.6%; it is considered high if she scores above 1.66%. (All women who are over 60 have a score of at least 1.66 and are considered high risk, based on the Nat Model.)      Risk factors are categorized into 2 groups: Modifiable and Non-modifiable. Modifiable risk factors include use of hormones, alcohol, smoking, diet and exercise. Non-modifiable risk factors include breast density, genetics, chest radiation, previous pregnancies, age of first period, and age of menopause.    * For women at high risk for breast cancer, endocrine therapy can reduce the risk of invasive and/or in situ breast cancers. (tamoxifen for premenopausal or postmenopausal women and raloxifene or exemestane for postmenopausal women).   * Discussed that Tamoxifen 20 mg daily for 5 years has shown to reduce risk of breast cancer by 49% and women with ADH/ALH or LCIS have an even more significant reduction of risk of 86%. Aromatase inhibitors for  5 years have also shown risk reduction in terms of 50-60%. At current, there is not adequate data to recommend longer courses of therapy more than 5 years for risk reduction.       * Reviewed risks of Tamoxifen side effects include hot flashes, invasive endometrial cancer in women > 49 years of age (2.3/1000 compared to 0.9/1000), cataracts, increased risk of pulmonary embolism among others.    * Reviewed Lifestyle modifications which have shown benefit:  Limit alcohol consumption to less than 1 drink per day (1 ounce liquor, 6 oz wine, 8 oz beer)  Avoid smoking.  Exercise at least 150 minutes per week of moderate intensity aerobic activity or at least 75 minutes of vigorous activity. Exercise can lower the relative risk of breast cancer by ~18-20%.  Maintain healthy weight and avoid post-menopausal weight gain. Avoid processed foods and eat more lean proteins, fruits and vegetables.                * Discussed available resources including genetic counseling, nutrition, weight management.    * Reviewed future screening:   Semiannual (every 6 months) CBE (clinical breast exam).   Annual Breast MRI alternating with an annual MMG. if TC 20% or greater.     Plan:     Patient has opted out chemoprevention but will call in the future if she wishes to pursue.   Patient elects to proceed with alternating annual mammogram and annual breast MRI along with semiannual CBEs.  Patient will follow up with PCP or GYN for one semiannual CBE along with annual mammogram in March 2024 and will RTC here for one semiannual CBE along with annual breast MRI in September 2023.  Lifestyle modifications as detailed above.   5.   Encouraged breast awareness, including monthly breast self-exams.       RTC in 1 year to see me either at [facility] or on [address] [place] [address].     Questions were encouraged and answered to patient's satisfaction, and patient verbalized understanding of information and agreement with the plan. Advised patient to RTC with any  interval changes or concerns.        Patient is in agreement with the proposed treatment plan. All questions were answered to the patient's satisfaction. Patient knows to call clinic for any new or worsening symptoms and if anything is needed before the next clinic visit.          Mel Mcfarland, LLOYDP-C  Hematology & Medical Oncology   [facility] [place] [address]  ph. [phone]  Fax. [phone]    Collaborating physician, Dr. Naranjo.    Total time spent with the patient: 20 minutes, 10 minutes of face to face consultation and 10 minutes of chart review and coordination of care, on the day of the visit. This includes face to face time and non-face to face time preparing to see the patient (eg, review of tests), obtaining and/or reviewing separately obtained history, documenting clinical information in the electronic or other health record, independently interpreting resultsand communicating results to the patient/family/caregiver, or care coordination.    Route Chart for Scheduling    Med Onc Chart Routing      Follow up with physician    Follow up with [facility] 1 year.   Infusion scheduling note    Injection scheduling note    Labs    Imaging MRI and mammogram   Mammogram due in March   Pharmacy appointment    Other referrals

## 2023-04-25 ENCOUNTER — OFFICE VISIT (OUTPATIENT)
Dept: HEMATOLOGY/ONCOLOGY | Facility: CLINIC | Age: 48
End: 2023-04-25
Payer: OTHER GOVERNMENT

## 2023-04-25 VITALS
HEART RATE: 67 BPM | SYSTOLIC BLOOD PRESSURE: 147 MMHG | BODY MASS INDEX: 38.38 KG/M2 | OXYGEN SATURATION: 98 % | RESPIRATION RATE: 18 BRPM | DIASTOLIC BLOOD PRESSURE: 69 MMHG | HEIGHT: 62 IN | TEMPERATURE: 98 F | WEIGHT: 208.56 LBS

## 2023-04-25 DIAGNOSIS — Z91.89 AT HIGH RISK FOR BREAST CANCER: Primary | ICD-10-CM

## 2023-04-25 PROCEDURE — 99999 PR PBB SHADOW E&M-EST. PATIENT-LVL IV: ICD-10-PCS | Mod: PBBFAC,,, | Performed by: NURSE PRACTITIONER

## 2023-04-25 PROCEDURE — 99214 OFFICE O/P EST MOD 30 MIN: CPT | Mod: PBBFAC | Performed by: NURSE PRACTITIONER

## 2023-04-25 PROCEDURE — 99214 PR OFFICE/OUTPT VISIT, EST, LEVL IV, 30-39 MIN: ICD-10-PCS | Mod: S$PBB,,, | Performed by: NURSE PRACTITIONER

## 2023-04-25 PROCEDURE — 99214 OFFICE O/P EST MOD 30 MIN: CPT | Mod: S$PBB,,, | Performed by: NURSE PRACTITIONER

## 2023-04-25 PROCEDURE — 99999 PR PBB SHADOW E&M-EST. PATIENT-LVL IV: CPT | Mod: PBBFAC,,, | Performed by: NURSE PRACTITIONER

## 2023-06-21 ENCOUNTER — TELEPHONE (OUTPATIENT)
Dept: ENDOSCOPY | Facility: HOSPITAL | Age: 48
End: 2023-06-21
Payer: OTHER GOVERNMENT

## 2023-06-21 VITALS — WEIGHT: 190 LBS | HEIGHT: 62 IN | BODY MASS INDEX: 34.96 KG/M2

## 2023-06-21 DIAGNOSIS — Z12.11 SPECIAL SCREENING FOR MALIGNANT NEOPLASMS, COLON: ICD-10-CM

## 2023-06-21 DIAGNOSIS — Z12.10 ENCOUNTER FOR SCREENING FOR MALIGNANT NEOPLASM OF INTESTINAL TRACT, UNSPECIFIED: Primary | ICD-10-CM

## 2023-06-21 RX ORDER — POLYETHYLENE GLYCOL 3350, SODIUM SULFATE ANHYDROUS, SODIUM BICARBONATE, SODIUM CHLORIDE, POTASSIUM CHLORIDE 236; 22.74; 6.74; 5.86; 2.97 G/4L; G/4L; G/4L; G/4L; G/4L
4 POWDER, FOR SOLUTION ORAL ONCE
Qty: 4000 ML | Refills: 0 | Status: SHIPPED | OUTPATIENT
Start: 2023-06-21 | End: 2023-06-21

## 2023-06-21 NOTE — TELEPHONE ENCOUNTER
Spoke to patient to schedule procedure(s) Colonoscopy       Physician to perform procedure(s) Dr. HILLARY Beckman  Date of Procedure (s) 7/3/23  Arrival Time 11:30 AM  Time of Procedure(s) 12:30 PM   Location of Procedure(s) [facility] 4th Floor  Type of Rx Prep sent to patient: PEG  Instructions provided to patient via [facility] and e-mailed.    Patient was informed on the following information and verbalized understanding. Screening questionnaire reviewed with patient and complete. If procedure requires anesthesia, a responsible adult needs to be present to accompany the patient home, patient cannot drive after receiving anesthesia. Appointment details are tentative, especially check-in time. Patient will receive a prep-op call 4 days prior to confirm check-in time for procedure. If applicable the patient should contact their pharmacy to verify Rx for procedure prep is ready for pick-up. Patient was advised to call the scheduling department at [phone] if pharmacy states no Rx is available. Patient was advised to call the endoscopy scheduling department if any questions or concerns arise.      [facility] Endoscopy Scheduling Department

## 2023-07-10 ENCOUNTER — TELEPHONE (OUTPATIENT)
Dept: ENDOSCOPY | Facility: HOSPITAL | Age: 48
End: 2023-07-10
Payer: OTHER GOVERNMENT

## 2023-07-10 NOTE — TELEPHONE ENCOUNTER
Patient called to reschedule colonoscopy. Informed patient that her colonoscopy referral from 12/12/22 expires tomorrow.  Patient states she recently saw her MD and [facility]. Patient states she will call endoscopy scheduling to schedule once it in.

## 2023-08-14 ENCOUNTER — PATIENT MESSAGE (OUTPATIENT)
Dept: ENDOSCOPY | Facility: HOSPITAL | Age: 48
End: 2023-08-14
Payer: OTHER GOVERNMENT

## 2023-08-14 ENCOUNTER — TELEPHONE (OUTPATIENT)
Dept: ENDOSCOPY | Facility: HOSPITAL | Age: 48
End: 2023-08-14
Payer: OTHER GOVERNMENT

## 2023-08-14 NOTE — TELEPHONE ENCOUNTER
Spoke to pt to schedule procedure(s) Colonoscopy       Physician to perform procedure(s) Dr. ALEX Pan  Date of Procedure (s) 11/6/23  Arrival Time 12:30 PM  Time of Procedure(s) 1:30 PM   Location of Procedure(s) [address]  Type of Rx Prep sent to patient: PEG  Instructions provided to patient via [facility]    Patient was informed on the following information and verbalized understanding. Screening questionnaire reviewed with patient and complete. If procedure requires anesthesia, a responsible adult needs to be present to accompany the patient home, patient cannot drive after receiving anesthesia. Appointment details are tentative, especially check-in time. Patient will receive a prep-op call 4 days prior to confirm check-in time for procedure. If applicable the patient should contact their pharmacy to verify Rx for procedure prep is ready for pick-up. Patient was advised to call the scheduling department at [phone] if pharmacy states no Rx is available. Patient was advised to call the endoscopy scheduling department if any questions or concerns arise.      [facility] Endoscopy Scheduling Department

## 2023-09-13 ENCOUNTER — HOSPITAL ENCOUNTER (OUTPATIENT)
Dept: RADIOLOGY | Facility: HOSPITAL | Age: 48
Discharge: HOME | End: 2023-09-13
Attending: NURSE PRACTITIONER
Payer: OTHER GOVERNMENT

## 2023-09-13 DIAGNOSIS — Z91.89 AT HIGH RISK FOR BREAST CANCER: ICD-10-CM

## 2023-09-13 PROCEDURE — 77049 MRI BREAST C-+ W/CAD BI: CPT | Mod: 26,,, | Performed by: RADIOLOGY

## 2023-09-13 PROCEDURE — A9577 INJ MULTIHANCE: HCPCS | Performed by: NURSE PRACTITIONER

## 2023-09-13 PROCEDURE — 25500020 PHARM REV CODE 255: Performed by: NURSE PRACTITIONER

## 2023-09-13 PROCEDURE — 77049 MRI BREAST C-+ W/CAD BI: CPT | Mod: TC

## 2023-09-13 PROCEDURE — 77049 MRI BREAST W/WO CONTRAST, W/CAD, BILATERAL: ICD-10-PCS | Mod: 26,,, | Performed by: RADIOLOGY

## 2023-09-13 RX ADMIN — GADOBENATE DIMEGLUMINE 19 ML: 529 INJECTION, SOLUTION INTRAVENOUS at 11:09

## 2023-11-03 ENCOUNTER — TELEPHONE (OUTPATIENT)
Dept: GASTROENTEROLOGY | Facility: CLINIC | Age: 48
End: 2023-11-03
Payer: OTHER GOVERNMENT

## 2023-11-03 ENCOUNTER — TELEPHONE (OUTPATIENT)
Dept: ENDOSCOPY | Facility: HOSPITAL | Age: 48
End: 2023-11-03
Payer: OTHER GOVERNMENT

## 2023-11-03 VITALS — HEIGHT: 62 IN | WEIGHT: 190 LBS | BODY MASS INDEX: 34.96 KG/M2

## 2023-11-03 NOTE — TELEPHONE ENCOUNTER
Pt rec'd message from pre-services that her colonoscopy hasn't been approved yet and won't be in time. Pt rescheduled to 2-22-24 at . Still has her prep (PEG) and will update inst via portal.

## 2024-02-16 ENCOUNTER — TELEPHONE (OUTPATIENT)
Dept: ENDOSCOPY | Facility: HOSPITAL | Age: 49
End: 2024-02-16
Payer: OTHER GOVERNMENT

## 2024-02-16 NOTE — TELEPHONE ENCOUNTER
Contacted patient for pre-call for 2/22 colonoscopy scheduled at  location.  Patient thought she was scheduled at [facility] and does not want to go to .  Patient rescheduled to  for same day.    Spoke to patient to reschedule procedure(s) Colonoscopy       Physician to perform procedure(s) Dr. ALEX Porras  Date of Procedure (s) 2/22/24  Arrival Time 8:45 AM  Time of Procedure(s) 9:45 AM   Location of Procedure(s) [address]  Type of Rx Prep sent to patient's pharmacy: PEG  Instructions provided to patient via [facility]    The following information was discussed with patient, and patient verbalized understanding:  Screening questionnaire reviewed with patient and complete. If procedure requires anesthesia a responsible adult needs to be present to accompany the patient home. Appointment details are tentative, especially check-in time. Patient will receive a pre-op call 7 days prior to appointment to confirm check-in time for procedure. If applicable the patient should contact their pharmacy to verify Rx for procedure prep is ready for pick-up. Patient was advised to call the scheduling department at [phone] if pharmacy states no Rx is available. Patient was also advised to call the endoscopy scheduling department if any questions or concerns arise.       Endoscopy Scheduling Department

## 2024-02-22 ENCOUNTER — ANESTHESIA (OUTPATIENT)
Dept: ENDOSCOPY | Facility: HOSPITAL | Age: 49
End: 2024-02-22
Payer: OTHER GOVERNMENT

## 2024-02-22 ENCOUNTER — HOSPITAL ENCOUNTER (OUTPATIENT)
Facility: HOSPITAL | Age: 49
Discharge: HOME | End: 2024-02-22
Attending: INTERNAL MEDICINE | Admitting: INTERNAL MEDICINE
Payer: OTHER GOVERNMENT

## 2024-02-22 ENCOUNTER — ANESTHESIA EVENT (OUTPATIENT)
Dept: ENDOSCOPY | Facility: HOSPITAL | Age: 49
End: 2024-02-22
Payer: OTHER GOVERNMENT

## 2024-02-22 VITALS
OXYGEN SATURATION: 100 % | TEMPERATURE: 98 F | HEIGHT: 62 IN | WEIGHT: 189 LBS | DIASTOLIC BLOOD PRESSURE: 77 MMHG | HEART RATE: 70 BPM | RESPIRATION RATE: 18 BRPM | SYSTOLIC BLOOD PRESSURE: 174 MMHG | BODY MASS INDEX: 34.78 KG/M2

## 2024-02-22 DIAGNOSIS — Z12.11 ENCOUNTER FOR SCREENING COLONOSCOPY: ICD-10-CM

## 2024-02-22 LAB
B-HCG UR QL: NEGATIVE
CTP QC/QA: YES

## 2024-02-22 PROCEDURE — 63600175 PHARM REV CODE 636 W HCPCS: Performed by: NURSE ANESTHETIST, CERTIFIED REGISTERED

## 2024-02-22 PROCEDURE — 81025 URINE PREGNANCY TEST: CPT | Performed by: INTERNAL MEDICINE

## 2024-02-22 PROCEDURE — 37000009 HC ANESTHESIA EA ADD 15 MINS: Performed by: INTERNAL MEDICINE

## 2024-02-22 PROCEDURE — 25000003 PHARM REV CODE 250: Performed by: NURSE ANESTHETIST, CERTIFIED REGISTERED

## 2024-02-22 PROCEDURE — G0121 COLON CA SCRN NOT HI RSK IND: HCPCS | Mod: 53,,, | Performed by: INTERNAL MEDICINE

## 2024-02-22 PROCEDURE — 37000008 HC ANESTHESIA 1ST 15 MINUTES: Performed by: INTERNAL MEDICINE

## 2024-02-22 PROCEDURE — G0121 COLON CA SCRN NOT HI RSK IND: HCPCS | Mod: 74 | Performed by: INTERNAL MEDICINE

## 2024-02-22 PROCEDURE — E9220 PRA ENDO ANESTHESIA: HCPCS | Mod: ,,, | Performed by: NURSE ANESTHETIST, CERTIFIED REGISTERED

## 2024-02-22 RX ORDER — SODIUM CHLORIDE 9 MG/ML
INJECTION, SOLUTION INTRAVENOUS CONTINUOUS
Status: DISCONTINUED | OUTPATIENT
Start: 2024-02-22 | End: 2024-02-22 | Stop reason: HOSPADM

## 2024-02-22 RX ORDER — SODIUM CHLORIDE 9 MG/ML
INJECTION, SOLUTION INTRAVENOUS CONTINUOUS
Status: CANCELLED | OUTPATIENT
Start: 2024-02-22

## 2024-02-22 RX ORDER — SODIUM CHLORIDE 0.9 % (FLUSH) 0.9 %
10 SYRINGE (ML) INJECTION
Status: CANCELLED | OUTPATIENT
Start: 2024-02-22

## 2024-02-22 RX ORDER — PROPOFOL 10 MG/ML
VIAL (ML) INTRAVENOUS
Status: DISCONTINUED | OUTPATIENT
Start: 2024-02-22 | End: 2024-02-28

## 2024-02-22 RX ORDER — LIDOCAINE HYDROCHLORIDE 20 MG/ML
INJECTION INTRAVENOUS
Status: DISCONTINUED | OUTPATIENT
Start: 2024-02-22 | End: 2024-02-28

## 2024-02-22 RX ADMIN — SODIUM CHLORIDE: 0.9 INJECTION, SOLUTION INTRAVENOUS at 10:02

## 2024-02-22 RX ADMIN — PROPOFOL 100 MG: 10 INJECTION, EMULSION INTRAVENOUS at 10:02

## 2024-02-22 RX ADMIN — LIDOCAINE HYDROCHLORIDE 50 MG: 20 INJECTION INTRAVENOUS at 10:02

## 2024-02-22 NOTE — H&P
Short Stay Endoscopy History and Physical    PCP - No, Primary Doctor    Procedure - Colonoscopy  ASA - per anesthesia  Mallampati - per anesthesia  History of Anesthesia problems - no  Family history Anesthesia problems - no   Plan of anesthesia - General    HPI:  This is a 48 y.o. female here for evaluation of : asymptomatic screening exam      ROS:  Constitutional: No fevers, chills, No weight loss  CV: No chest pain  Pulm: No cough, No shortness of breath  GI: see HPI  Derm: No rash    Medical History:  has no past medical history on file.    Surgical History:  has a past surgical history that includes Total Reduction Mammoplasty (Bilateral, 2007) and Oophorectomy.    Family History: family history includes Breast cancer in her maternal aunt; Breast cancer (age of onset: 61) in her mother.. Otherwise no colon cancer, inflammatory bowel disease, or GI malignancies.    Social History:      Review of patient's allergies indicates:  No Known Allergies    Medications:   Medications Prior to Admission   Medication Sig Dispense Refill Last Dose    amLODIPine (NORVASC) 10 MG tablet Take 1 tablet by mouth once daily.       amoxicillin (AMOXIL) 500 MG capsule Take 500 mg by mouth 3 (three) times daily.       diclofenac sodium (VOLTAREN) 1 % Gel Apply topically.       levonorgestrel-ethinyl estradiol (SEASONALE) 0.15 mg-30 mcg (91) per tablet Take 1 tablet by mouth once daily.       naproxen (NAPROSYN) 500 MG tablet Take 1 tablet (500 mg total) by mouth 2 (two) times daily with meals. 60 tablet 0     ondansetron (ZOFRAN-ODT) 4 MG TbDL Take 1 tablet (4 mg total) by mouth every 6 (six) hours as needed. 15 tablet 0     traZODone (DESYREL) 100 MG tablet Take 1 tablet by mouth nightly as needed.            Physical Exam:    Vital Signs: There were no vitals filed for this visit.    General Appearance: Well appearing in no acute distress  Eyes:    No scleral icterus  ENT: Neck supple, Lips, mucosa, and tongue normal; teeth  and gums normal  Abdomen: Soft, non tender, non distended with positive bowel sounds. No hepatosplenomegaly, ascites, or mass.  Extremities: 2+ pulses, no clubbing, cyanosis or edema  Skin: No rash      Labs:  Lab Results   Component Value Date    WBC 11.42 10/28/2022    HGB 12.3 10/28/2022    HCT 36.6 (L) 10/28/2022     10/28/2022    CHOL 137 08/31/2010    TRIG 84 08/31/2010    HDL 35 (L) 08/31/2010    ALT 22 10/28/2022    AST 14 10/28/2022     10/28/2022    K 3.7 10/28/2022     10/28/2022    CREATININE 0.8 10/28/2022    BUN 9 10/28/2022    CO2 24 10/28/2022    TSH 1.39 07/20/2010    INR 0.9 08/13/2008       I have explained the risks and benefits of endoscopy procedures to the patient including but not limited to bleeding, perforation, infection, and death.  The patient was asked if they understand and allowed to ask any further questions to their satisfaction.    Pennie Lugo DO

## 2024-02-22 NOTE — PROVATION PATIENT INSTRUCTIONS
Discharge Summary/Instructions after an Endoscopic Procedure  Patient Name: Kermit Lucas  Patient MRN: [id number]  Patient YOB: 1975 Thursday, February 22, 2024  Gladys Porras MD  Dear patient,  As a result of recent federal legislation (The Federal Cures Act), you may   receive lab or pathology results from your procedure in your [facility]   account before your physician is able to contact you. Your physician or   their representative will relay the results to you with their   recommendations at their soonest availability.  Thank you,  RESTRICTIONS:  During your procedure today, you received medications for sedation.  These   medications may affect your judgment, balance and coordination.  Therefore,   for 24 hours, you have the following restrictions:   - DO NOT drive a car, operate machinery, make legal/financial decisions,   sign important papers or drink alcohol.    ACTIVITY:  Today: no heavy lifting, straining or running due to procedural   sedation/anesthesia.  The following day: return to full activity including work.  DIET:  Eat and drink normally unless instructed otherwise.     TREATMENT FOR COMMON SIDE EFFECTS:  - Mild abdominal pain, nausea, belching, bloating or excessive gas:  rest,   eat lightly and use a heating pad.  - Sore Throat: treat with throat lozenges and/or gargle with warm salt   water.  - Because air was used during the procedure, expelling large amounts of air   from your rectum or belching is normal.  - If a bowel prep was taken, you may not have a bowel movement for 1-3 days.    This is normal.  SYMPTOMS TO WATCH FOR AND REPORT TO YOUR PHYSICIAN:  1. Abdominal pain or bloating, other than gas cramps.  2. Chest pain.  3. Back pain.  4. Signs of infection such as: chills or fever occurring within 24 hours   after the procedure.  5. Rectal bleeding, which would show as bright red, maroon, or black stools.   (A tablespoon of blood from the rectum is not serious, especially if    hemorrhoids are present.)  6. Vomiting.  7. Weakness or dizziness.  GO DIRECTLY TO THE NEAREST EMERGENCY ROOM IF YOU HAVE ANY OF THE FOLLOWING:      Difficulty breathing              Chills and/or fever over 101 F   Persistent vomiting and/or vomiting blood   Severe abdominal pain   Severe chest pain   Black, tarry stools   Bleeding- more than one tablespoon   Any other symptom or condition that you feel may need urgent attention  Your doctor recommends these additional instructions:  If any biopsies were taken, your doctors clinic will contact you in 1 to 2   weeks with any results.  - Patient has a contact number available for emergencies.  The signs and   symptoms of potential delayed complications were discussed with the   patient.  Return to normal activities tomorrow.  Written discharge   instructions were provided to the patient.   - Discharge patient to home (ambulatory).   - Resume previous diet.   - Continue present medications.   - Repeat colonoscopy at next available appointment (within 3 months) because   the bowel preparation was poor and because the bowel preparation was   suboptimal. Will need extended bowel prep and recommend case done on 2nd   floor [facility].  - Return to referring physician.   - Return to referring physician.   - For future colonoscopy the patient will require an extended preparation.    If there are any questions, please contact the gastroenterologist.  For questions, problems or results please call your physician - Gladys Porras MD at Work:  [phone].  [facility], EMERGENCY ROOM PHONE NUMBER: [phone]  IF A COMPLICATION OR EMERGENCY SITUATION ARISES AND YOU ARE UNABLE TO REACH   YOUR PHYSICIAN - GO DIRECTLY TO THE EMERGENCY ROOM.  Gladys Porras MD  2/22/2024 11:27:53 AM  This report has been verified and signed electronically.  Dear patient,  As a result of recent federal legislation (The Federal Cures Act), you may   receive lab or pathology results  from your procedure in your [facility]   account before your physician is able to contact you. Your physician or   their representative will relay the results to you with their   recommendations at their soonest availability.  Thank you,  PROVATION

## 2024-02-22 NOTE — ANESTHESIA PREPROCEDURE EVALUATION
02/22/2024  Kermit Lucas is a 48 y.o., female.    History reviewed. No pertinent past medical history.  Past Surgical History:   Procedure Laterality Date    OOPHORECTOMY      TOTAL REDUCTION MAMMOPLASTY Bilateral 2007       Pre-op Assessment    I have reviewed the Patient Summary Reports.     I have reviewed the Nursing Notes. I have reviewed the NPO Status.   I have reviewed the Medications.     Review of Systems  Anesthesia Hx:  No problems with previous Anesthesia             Denies Family Hx of Anesthesia complications.    Denies Personal Hx of Anesthesia complications.                    Hematology/Oncology:  Hematology Normal   Oncology Normal                                   EENT/Dental:  EENT/Dental Normal           Cardiovascular:     Hypertension                                        Pulmonary:  Pulmonary Normal                       Renal/:  Renal/ Normal                 Hepatic/GI:  Hepatic/GI Normal Bowel Prep.                Musculoskeletal:  Musculoskeletal Normal                Neurological:  Neurology Normal                                      Endocrine:  Endocrine Normal            Dermatological:  Skin Normal    Psych:  Psychiatric Normal                    Physical Exam  General: Well nourished    Airway:  Mallampati: II   Mouth Opening: Normal  TM Distance: Normal  Tongue: Normal  Neck ROM: Normal ROM    Dental:  Intact        Anesthesia Plan  Type of Anesthesia, risks & benefits discussed:    Anesthesia Type: Gen Natural Airway  Intra-op Monitoring Plan: Standard ASA Monitors  Informed Consent: Informed consent signed with the Patient and all parties understand the risks and agree with anesthesia plan.  All questions answered.   ASA Score: 2  Day of Surgery Review of History & Physical: H&P Update referred to the surgeon/provider.I have interviewed and examined the patient. I  have reviewed the patient's H&P dated: There are no significant changes.     Ready For Surgery From Anesthesia Perspective.     .

## 2024-02-22 NOTE — TRANSFER OF CARE
Anesthesia Transfer of Care Note    Patient: Kermit Lucas    Procedure(s) Performed: Procedure(s) (LRB):  COLONOSCOPY (N/A)    Patient location: GI    Anesthesia Type: general    Transport from OR: Transported from OR on room air with adequate spontaneous ventilation    Post pain: adequate analgesia    Post assessment: no apparent anesthetic complications    Post vital signs: stable    Level of consciousness: sedated    Nausea/Vomiting: no nausea/vomiting    Complications: none    Transfer of care protocol was followed      Last vitals: BP: 130/58; HR: 75; RR: 16; o2sat: 100%; T: 36.5C

## 2024-02-27 ENCOUNTER — TELEPHONE (OUTPATIENT)
Dept: ENDOSCOPY | Facility: HOSPITAL | Age: 49
End: 2024-02-27
Payer: OTHER GOVERNMENT

## 2024-02-27 DIAGNOSIS — Z12.11 ENCOUNTER FOR SCREENING COLONOSCOPY: Primary | ICD-10-CM

## 2024-02-27 NOTE — TELEPHONE ENCOUNTER
"----- Message from Riddhi Ann sent at 2/22/2024  3:11 PM CST -----  Regarding: FW: Colonoscopy    ----- Message -----  From: Pennie Lugo DO  Sent: 2/22/2024  11:25 AM CST  To: [facility]  Subject: Colonoscopy                                      Procedure: Colonoscopy    Diagnosis: Screening colonoscopy    Procedure Timing: 3 months     #If within 4 weeks selected, please lopez as high priority#    #If greater than 12 weeks, please select "5-12 weeks" and delay sending until 2 months prior to requested date#     Provider: Any GI provider    Location: [address]    Additional Scheduling Information: No scheduling concerns    Prep Specifications:Extended/Constipation prep    Is the patient taking a GLP-1 Agonist:no    Have you attached a patient to this message: yes        "

## 2024-02-29 NOTE — ANESTHESIA POSTPROCEDURE EVALUATION
Anesthesia Post Evaluation    Patient: Kermit Lucas    Procedure(s) Performed: Procedure(s) (LRB):  COLONOSCOPY (N/A)    Final Anesthesia Type: general      Patient location during evaluation: PACU  Patient participation: Yes- Able to Participate  Level of consciousness: awake and alert  Post-procedure vital signs: reviewed and stable  Pain management: adequate  Airway patency: patent    PONV status at discharge: No PONV  Anesthetic complications: no      Cardiovascular status: blood pressure returned to baseline  Respiratory status: spontaneous ventilation and room air  Hydration status: euvolemic  Follow-up not needed.              Vitals Value Taken Time   /77 02/22/24 1156   Temp 36.5 °C (97.7 °F) 02/22/24 1126   Pulse 70 02/22/24 1156   Resp 18 02/22/24 1156   SpO2 100 % 02/22/24 1156         Event Time   Out of Recovery 12:00:26         Pain/Gely Score: No data recorded

## 2024-03-18 ENCOUNTER — HOSPITAL ENCOUNTER (OUTPATIENT)
Dept: RADIOLOGY | Facility: HOSPITAL | Age: 49
Discharge: HOME | End: 2024-03-18
Attending: NURSE PRACTITIONER
Payer: OTHER GOVERNMENT

## 2024-03-18 DIAGNOSIS — Z91.89 AT HIGH RISK FOR BREAST CANCER: ICD-10-CM

## 2024-03-18 PROCEDURE — 77067 SCR MAMMO BI INCL CAD: CPT | Mod: 26,,, | Performed by: RADIOLOGY

## 2024-03-18 PROCEDURE — 77067 SCR MAMMO BI INCL CAD: CPT | Mod: TC

## 2024-03-18 PROCEDURE — 77063 BREAST TOMOSYNTHESIS BI: CPT | Mod: 26,,, | Performed by: RADIOLOGY

## 2024-06-05 ENCOUNTER — PATIENT MESSAGE (OUTPATIENT)
Dept: ENDOSCOPY | Facility: HOSPITAL | Age: 49
End: 2024-06-05
Payer: OTHER GOVERNMENT

## 2024-06-05 ENCOUNTER — TELEPHONE (OUTPATIENT)
Dept: ENDOSCOPY | Facility: HOSPITAL | Age: 49
End: 2024-06-05
Payer: OTHER GOVERNMENT

## 2024-06-11 ENCOUNTER — TELEPHONE (OUTPATIENT)
Dept: ENDOSCOPY | Facility: HOSPITAL | Age: 49
End: 2024-06-11
Payer: OTHER GOVERNMENT

## 2024-06-12 ENCOUNTER — TELEPHONE (OUTPATIENT)
Dept: ENDOSCOPY | Facility: HOSPITAL | Age: 49
End: 2024-06-12
Payer: OTHER GOVERNMENT

## 2024-06-12 DIAGNOSIS — Z12.11 COLON CANCER SCREENING: Primary | ICD-10-CM

## 2024-06-12 RX ORDER — POLYETHYLENE GLYCOL 3350, SODIUM SULFATE ANHYDROUS, SODIUM BICARBONATE, SODIUM CHLORIDE, POTASSIUM CHLORIDE 236; 22.74; 6.74; 5.86; 2.97 G/4L; G/4L; G/4L; G/4L; G/4L
POWDER, FOR SOLUTION ORAL
Qty: 8000 ML | Refills: 0 | Status: SHIPPED | OUTPATIENT
Start: 2024-06-12

## 2024-06-12 NOTE — TELEPHONE ENCOUNTER
Spoke to patient to reschedule procedure(s) Colonoscopy       Physician to perform procedure(s) Dr. ALBERTO Sylvester  Date of Procedure (s) 9/11/24  Arrival Time 7:15 AM  Time of Procedure(s) 8:15 AM   Location of Procedure(s) [place] 2nd Floor  Type of Rx Prep sent to patient: PEG Extended  Instructions provided to patient via Email and [facility]     Patient was informed on the following information and verbalized understanding. Screening questionnaire reviewed with patient and complete. If procedure requires anesthesia, a responsible adult needs to be present to accompany the patient home, patient cannot drive after receiving anesthesia. Appointment details are tentative, especially check-in time. Patient will receive a prep-op call 7 days prior to confirm check-in time for procedure. If applicable the patient should contact their pharmacy to verify Rx for procedure prep is ready for pick-up. Patient was advised to call the scheduling department at [phone] if pharmacy states no Rx is available. Patient was advised to call the endoscopy scheduling department if any questions or concerns arise.      [facility] Endoscopy Scheduling Department

## 2024-09-04 ENCOUNTER — TELEPHONE (OUTPATIENT)
Dept: ENDOSCOPY | Facility: HOSPITAL | Age: 49
End: 2024-09-04
Payer: OTHER GOVERNMENT

## 2024-09-04 NOTE — PROGRESS NOTES
Reason For Consultation:   Increased lifetime risk of breast cancer    Records Obtained: Records of the patients history including those obtained from the referring provider were reviewed and summarized in detail.    HPI:   Kermit Lucas presents for follow up for increased risk of breast cancer. She is premenopausal. She presented for screening mammogram on 3/28/22 which was benign but revealed a Tyrer-Cuzick score of 28.84%.       Today, Feels good and no complaints.   No breast concerns. Denies nipple discharge. Denies skin changes to the breasts.   Denies family history changes.   Denies smoking, occasional alcohol. She has been going to the gym 3 days a week.    Appetite has been good.   She follows with a GYN yearly.     High Risk Breast cancer specific history:  - Age: 49 y.o.   - Height:  1.612 meters  - Weight: 94.2 kg  - Breast density per BI-RADS: Heterogeneously Dense    - Age at menarche:  12  - Number of pregnancies: ; age of first live birth: 32  - History of breast feeding: No.   - Age at menopause, if applicable:  N/A.  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       Social History     Tobacco Use    Smoking status: Never   Substance Use Topics    Drug use: Never     Family History  Family History   Problem Relation Name Age of Onset    Breast cancer Mother  61        Reaccurrance @ 69    Breast cancer Maternal Aunt       Medications    Current Outpatient  "Medications:     amLODIPine (NORVASC) 10 MG tablet, Take 1 tablet by mouth once daily., Disp: , Rfl:     diclofenac sodium (VOLTAREN) 1 % Gel, Apply topically., Disp: , Rfl:     levonorgestrel-ethinyl estradiol (SEASONALE) 0.15 mg-30 mcg (91) per tablet, Take 1 tablet by mouth once daily., Disp: , Rfl:     naproxen (NAPROSYN) 500 MG tablet, Take 1 tablet (500 mg total) by mouth 2 (two) times daily with meals., Disp: 60 tablet, Rfl: 0    ondansetron (ZOFRAN-ODT) 4 MG TbDL, Take 1 tablet (4 mg total) by mouth every 6 (six) hours as needed., Disp: 15 tablet, Rfl: 0    polyethylene glycol (GOLYTELY) 236-22.74-6.74 -5.86 gram suspension, Day 1: Dose 1, 4000mL. Day 2: Dose 2, 4000mL. For total of 8000mL., Disp: 8000 mL, Rfl: 0    traZODone (DESYREL) 100 MG tablet, Take 1 tablet by mouth nightly as needed., Disp: , Rfl:   Allergies  Review of patient's allergies indicates:  No Known Allergies    Review of Systems       See above   Review of Systems  Review of Systems  All other systems reviewed and are negative.    Objective:      Vitals:   Vitals:    09/18/24 1213   BP: 139/70   BP Location: Right arm   Patient Position: Sitting   BP Method: Medium (Automatic)   Pulse: 75   SpO2: 98%   Weight: 88.2 kg (194 lb 7.1 oz)   Height: 5' 2" (1.575 m)         BMI: Body mass index is 35.56 kg/m².   Body surface area is 1.96 meters squared.    Physical Exam  Vitals and nursing note reviewed.   Constitutional:       General: She is not in acute distress.     Appearance: Normal appearance. She is well-developed.   HENT:      Head: Normocephalic.   Cardiovascular:      Rate and Rhythm: Normal rate and regular rhythm.      Heart sounds: Normal heart sounds.   Pulmonary:      Effort: Pulmonary effort is normal.      Breath sounds: Normal breath sounds.   Chest:   Breasts:     Right: Normal.      Left: Normal.   Abdominal:      General: Bowel sounds are normal. There is no distension.      Palpations: Abdomen is soft.      Tenderness: " There is no abdominal tenderness.   Musculoskeletal:      Cervical back: Normal range of motion.   Lymphadenopathy:      Cervical: No cervical adenopathy.      Upper Body:      Right upper body: No supraclavicular or axillary adenopathy.      Left upper body: No supraclavicular or axillary adenopathy.   Skin:     General: Skin is warm and dry.      Findings: No rash.   Neurological:      Mental Status: She is alert and oriented to person, place, and time.   Psychiatric:         Behavior: Behavior normal.         Laboratory Data: reviewed most recent   Imaging: reviewed most recent        Assessment:     1. At high risk for breast cancer        1. Increased risk of breast cancer   * Tyrer-Cuzick (TC) lifetime risk of 32.1% recalculated in clinic today. We discussed that TC score will categorize your lifetime risk of being diagnosed with breast cancer. Categories are as such: Average risk <15%, Intermediate risk 15-19%, and High risk > or = to 20%.    * The Nat Model for Breast Cancer risk: She has a 1.5% 5-year breast cancer risk (Compared with 1% for the average 49 y.o. woman) and 14.2% Lifetime breast cancer risk (Compared with 9.4% for the average 49 y.o. woman). A woman's risk is considered low if her five-year risk of developing breast cancer is less than 1.6%; it is considered high if she scores above 1.66%. (All women who are over 60 have a score of at least 1.66 and are considered high risk, based on the Nat Model.)      Risk factors are categorized into 2 groups: Modifiable and Non-modifiable. Modifiable risk factors include use of hormones, alcohol, smoking, diet and exercise. Non-modifiable risk factors include breast density, genetics, chest radiation, previous pregnancies, age of first period, and age of menopause.    * For women at high risk for breast cancer, endocrine therapy can reduce the risk of invasive and/or in situ breast cancers. (tamoxifen for premenopausal or postmenopausal women and  raloxifene or exemestane for postmenopausal women).   * Discussed that Tamoxifen 20 mg daily for 5 years has shown to reduce risk of breast cancer by 49% and women with ADH/ALH or LCIS have an even more significant reduction of risk of 86%. Aromatase inhibitors for 5 years have also shown risk reduction in terms of 50-60%. At current, there is not adequate data to recommend longer courses of therapy more than 5 years for risk reduction.       * Reviewed risks of Tamoxifen side effects include hot flashes, invasive endometrial cancer in women > 49 years of age (2.3/1000 compared to 0.9/1000), cataracts, increased risk of pulmonary embolism among others.    * Reviewed Lifestyle modifications which have shown benefit:  Limit alcohol consumption to less than 1 drink per day (1 ounce liquor, 6 oz wine, 8 oz beer)  Avoid smoking.  Exercise at least 150 minutes per week of moderate intensity aerobic activity or at least 75 minutes of vigorous activity. Exercise can lower the relative risk of breast cancer by ~18-20%.  Maintain healthy weight and avoid post-menopausal weight gain. Avoid processed foods and eat more lean proteins, fruits and vegetables.                * Discussed available resources including genetic counseling, nutrition, weight management.    * Reviewed future screening:   Semiannual (every 6 months) CBE (clinical breast exam).   Annual Breast MRI alternating with an annual MMG. if TC 20% or greater.     Plan:     Patient has opted out chemoprevention but will call in the future if she wishes to pursue.   Patient elects to proceed with alternating annual mammogram and annual breast MRI along with semiannual CBEs.  Patient will follow up with PCP or GYN for one semiannual CBE along with annual mammogram in March 2025 and will RTC here for one semiannual CBE along with annual breast MRI in September 2024.  Lifestyle modifications as detailed above.   5.   Encouraged breast awareness, including monthly breast  self-exams.       RTC in 1 year to see me either at [facility] or on 3rd floor.     Questions were encouraged and answered to patient's satisfaction, and patient verbalized understanding of information and agreement with the plan. Advised patient to RTC with any interval changes or concerns.        Patient is in agreement with the proposed treatment plan. All questions were answered to the patient's satisfaction. Patient knows to call clinic for any new or worsening symptoms and if anything is needed before the next clinic visit.          Mel Mcfarland, LLOYDP-C  Hematology & Medical Oncology   [facility] [place] [address]  ph. [phone]  Fax. [phone]    Collaborating physician, Dr. Naranjo.    Total time spent with the patient: 20 minutes, 10 minutes of face to face consultation and 10 minutes of chart review and coordination of care, on the day of the visit. This includes face to face time and non-face to face time preparing to see the patient (eg, review of tests), obtaining and/or reviewing separately obtained history, documenting clinical information in the electronic or other health record, independently interpreting resultsand communicating results to the patient/family/caregiver, or care coordination.    Route Chart for Scheduling    Med Onc Chart Routing      Follow up with physician    Follow up with [facility] 1 year.   Infusion scheduling note    Injection scheduling note    Labs    Imaging MRI and mammogram   MRI due in Sept, mammo due in March   Pharmacy appointment    Other referrals

## 2024-09-04 NOTE — TELEPHONE ENCOUNTER
Spoke to pt for pre-call to confirm scheduled Upper Endoscopy (EGD) and patient verbalized understanding of the following:       Date of Procedure (s)  verified 09/11/2024  Arrival Time 0715: AM verified.  Location of Procedure(s) [place] [address] Floor verified.  NPO status reinforced. Ok to continue clear liquids up until 4 hours prior to the Endoscopy procedure.   Pt confirmed receipt of prep instructions and Rx prep (if applicable).  Instructions provided to patient via [facility].  Reviewed extended/constipation prep instructions with pt, pt verbalized understanding.  Pt confirmed ride home after procedure if procedure requires anesthesia.   Pre-call screening questionnaire reviewed and completed with patient.   Appointment details are tentative, including check-in time.  If the patient begins taking any blood thinning medications, injectable weight loss/diabetes medications (other than insulin), or Adipex (phentermine) patient was instructed to contact the endoscopy scheduling department as soon as possible.  Patient was advised to call the endoscopy scheduling department if any questions or concerns arise.       [facility] Scheduling Department

## 2024-09-10 DIAGNOSIS — Z12.31 ENCOUNTER FOR SCREENING MAMMOGRAM FOR MALIGNANT NEOPLASM OF BREAST: Primary | ICD-10-CM

## 2024-09-16 ENCOUNTER — TELEPHONE (OUTPATIENT)
Dept: ENDOSCOPY | Facility: HOSPITAL | Age: 49
End: 2024-09-16
Payer: OTHER GOVERNMENT

## 2024-09-16 NOTE — TELEPHONE ENCOUNTER
.Spoke to patient for pre-call to confirm scheduled Colonoscopy and patient verbalized understanding of the following:       Date of Procedure (s)  verified 09/19/24  Arrival Time 0630 verified.  Location of Procedure(s) [address] verified.  NPO status reinforced. Ok to continue clear liquids up until 4 hours prior to the Endoscopy procedure.   Confirmed Pt is currently taking Ozempic (Semaglutide), Pt instructed to stop stop taking Ozempic (Semaglutide) on 9/11/24.     Instructions provided to patient via [facility]  Pt confirmed ride home after procedure if procedure requires anesthesia.   Pre-call screening questionnaire reviewed and completed with patient.   Appointment details are tentative, including check-in time.  If the patient begins taking any blood thinning medications, injectable weight loss/diabetes medications (other than insulin), or Adipex (phentermine) patient was instructed to contact the endoscopy scheduling department as soon as possible.  Patient was advised to call the endoscopy scheduling department if any questions or concerns arise.       [facility] Scheduling [facility]

## 2024-09-18 ENCOUNTER — OFFICE VISIT (OUTPATIENT)
Dept: HEMATOLOGY/ONCOLOGY | Facility: CLINIC | Age: 49
End: 2024-09-18
Payer: OTHER GOVERNMENT

## 2024-09-18 VITALS
BODY MASS INDEX: 35.78 KG/M2 | WEIGHT: 194.44 LBS | SYSTOLIC BLOOD PRESSURE: 139 MMHG | HEART RATE: 75 BPM | OXYGEN SATURATION: 98 % | HEIGHT: 62 IN | DIASTOLIC BLOOD PRESSURE: 70 MMHG

## 2024-09-18 DIAGNOSIS — Z91.89 AT HIGH RISK FOR BREAST CANCER: Primary | ICD-10-CM

## 2024-09-18 PROCEDURE — G2211 COMPLEX E/M VISIT ADD ON: HCPCS | Mod: S$PBB,,, | Performed by: NURSE PRACTITIONER

## 2024-09-18 PROCEDURE — 99999 PR PBB SHADOW E&M-EST. PATIENT-LVL III: CPT | Mod: PBBFAC,,, | Performed by: NURSE PRACTITIONER

## 2024-09-18 PROCEDURE — 99214 OFFICE O/P EST MOD 30 MIN: CPT | Mod: S$PBB,,, | Performed by: NURSE PRACTITIONER

## 2024-09-18 PROCEDURE — 99213 OFFICE O/P EST LOW 20 MIN: CPT | Mod: PBBFAC | Performed by: NURSE PRACTITIONER

## 2024-12-31 DIAGNOSIS — Z80.3 FAMILY HISTORY OF MALIGNANT NEOPLASM OF BREAST: Primary | ICD-10-CM

## 2025-03-19 ENCOUNTER — HOSPITAL ENCOUNTER (OUTPATIENT)
Dept: RADIOLOGY | Facility: HOSPITAL | Age: 50
Discharge: HOME | End: 2025-03-19
Attending: PHYSICIAN ASSISTANT
Payer: OTHER GOVERNMENT

## 2025-03-19 DIAGNOSIS — Z01.89 ENCOUNTER FOR OTHER SPECIFIED SPECIAL EXAMINATIONS: Primary | ICD-10-CM

## 2025-03-19 DIAGNOSIS — Z12.31 ENCOUNTER FOR SCREENING MAMMOGRAM FOR MALIGNANT NEOPLASM OF BREAST: ICD-10-CM

## 2025-03-19 PROCEDURE — 77067 SCR MAMMO BI INCL CAD: CPT | Mod: TC
